# Patient Record
Sex: FEMALE | Race: WHITE | NOT HISPANIC OR LATINO | URBAN - METROPOLITAN AREA
[De-identification: names, ages, dates, MRNs, and addresses within clinical notes are randomized per-mention and may not be internally consistent; named-entity substitution may affect disease eponyms.]

---

## 2023-10-30 ENCOUNTER — TELEPHONE (OUTPATIENT)
Dept: PRIMARY CARE | Facility: CLINIC | Age: 28
End: 2023-10-30

## 2023-10-30 DIAGNOSIS — L21.9 SEBORRHEIC DERMATITIS: Primary | ICD-10-CM

## 2023-10-30 PROBLEM — F41.1 GENERALIZED ANXIETY DISORDER: Status: ACTIVE | Noted: 2021-05-18

## 2023-10-30 RX ORDER — KETOCONAZOLE 20 MG/ML
SHAMPOO, SUSPENSION TOPICAL 2 TIMES WEEKLY
COMMUNITY
End: 2023-10-30 | Stop reason: SDUPTHER

## 2023-10-30 RX ORDER — KETOCONAZOLE 20 MG/ML
SHAMPOO, SUSPENSION TOPICAL 2 TIMES WEEKLY
Qty: 120 ML | Refills: 0 | Status: SHIPPED | OUTPATIENT
Start: 2023-10-30 | End: 2023-12-03

## 2023-12-02 DIAGNOSIS — L21.9 SEBORRHEIC DERMATITIS: ICD-10-CM

## 2023-12-03 RX ORDER — KETOCONAZOLE 20 MG/ML
SHAMPOO, SUSPENSION TOPICAL 2 TIMES WEEKLY
Qty: 120 ML | Refills: 0 | Status: SHIPPED | OUTPATIENT
Start: 2023-12-04

## 2024-09-17 PROBLEM — G43.009 MIGRAINE WITHOUT AURA AND WITHOUT STATUS MIGRAINOSUS, NOT INTRACTABLE: Status: ACTIVE | Noted: 2020-05-08

## 2024-09-19 ENCOUNTER — LAB (OUTPATIENT)
Dept: LAB | Facility: LAB | Age: 29
End: 2024-09-19

## 2024-09-19 ENCOUNTER — OFFICE VISIT (OUTPATIENT)
Dept: PRIMARY CARE | Facility: CLINIC | Age: 29
End: 2024-09-19

## 2024-09-19 VITALS
SYSTOLIC BLOOD PRESSURE: 112 MMHG | HEART RATE: 80 BPM | OXYGEN SATURATION: 98 % | BODY MASS INDEX: 23.24 KG/M2 | TEMPERATURE: 99.3 F | DIASTOLIC BLOOD PRESSURE: 60 MMHG | WEIGHT: 144 LBS

## 2024-09-19 DIAGNOSIS — R53.83 FATIGUE, UNSPECIFIED TYPE: Primary | ICD-10-CM

## 2024-09-19 DIAGNOSIS — N94.6 DYSMENORRHEA: ICD-10-CM

## 2024-09-19 DIAGNOSIS — R53.83 FATIGUE, UNSPECIFIED TYPE: ICD-10-CM

## 2024-09-19 LAB
ALBUMIN SERPL BCP-MCNC: 4.3 G/DL (ref 3.4–5)
ALP SERPL-CCNC: 49 U/L (ref 33–110)
ALT SERPL W P-5'-P-CCNC: 20 U/L (ref 7–45)
ANION GAP SERPL CALCULATED.3IONS-SCNC: 11 MMOL/L (ref 10–20)
AST SERPL W P-5'-P-CCNC: 22 U/L (ref 9–39)
BASOPHILS # BLD AUTO: 0.04 X10*3/UL (ref 0–0.1)
BASOPHILS NFR BLD AUTO: 0.4 %
BILIRUB SERPL-MCNC: 0.6 MG/DL (ref 0–1.2)
BUN SERPL-MCNC: 19 MG/DL (ref 6–23)
CALCIUM SERPL-MCNC: 9.1 MG/DL (ref 8.6–10.3)
CHLORIDE SERPL-SCNC: 105 MMOL/L (ref 98–107)
CO2 SERPL-SCNC: 26 MMOL/L (ref 21–32)
CREAT SERPL-MCNC: 0.75 MG/DL (ref 0.5–1.05)
EGFRCR SERPLBLD CKD-EPI 2021: >90 ML/MIN/1.73M*2
EOSINOPHIL # BLD AUTO: 0.09 X10*3/UL (ref 0–0.7)
EOSINOPHIL NFR BLD AUTO: 0.9 %
ERYTHROCYTE [DISTWIDTH] IN BLOOD BY AUTOMATED COUNT: 12.8 % (ref 11.5–14.5)
GLUCOSE SERPL-MCNC: 80 MG/DL (ref 74–99)
HCT VFR BLD AUTO: 42.7 % (ref 36–46)
HGB BLD-MCNC: 14.2 G/DL (ref 12–16)
IMM GRANULOCYTES # BLD AUTO: 0.03 X10*3/UL (ref 0–0.7)
IMM GRANULOCYTES NFR BLD AUTO: 0.3 % (ref 0–0.9)
LYMPHOCYTES # BLD AUTO: 3.21 X10*3/UL (ref 1.2–4.8)
LYMPHOCYTES NFR BLD AUTO: 32 %
MCH RBC QN AUTO: 28.7 PG (ref 26–34)
MCHC RBC AUTO-ENTMCNC: 33.3 G/DL (ref 32–36)
MCV RBC AUTO: 86 FL (ref 80–100)
MONOCYTES # BLD AUTO: 0.68 X10*3/UL (ref 0.1–1)
MONOCYTES NFR BLD AUTO: 6.8 %
NEUTROPHILS # BLD AUTO: 5.99 X10*3/UL (ref 1.2–7.7)
NEUTROPHILS NFR BLD AUTO: 59.6 %
NRBC BLD-RTO: 0 /100 WBCS (ref 0–0)
PLATELET # BLD AUTO: 208 X10*3/UL (ref 150–450)
POTASSIUM SERPL-SCNC: 4.5 MMOL/L (ref 3.5–5.3)
PROT SERPL-MCNC: 7 G/DL (ref 6.4–8.2)
RBC # BLD AUTO: 4.95 X10*6/UL (ref 4–5.2)
SODIUM SERPL-SCNC: 137 MMOL/L (ref 136–145)
TSH SERPL-ACNC: 0.83 MIU/L (ref 0.44–3.98)
WBC # BLD AUTO: 10 X10*3/UL (ref 4.4–11.3)

## 2024-09-19 PROCEDURE — 99214 OFFICE O/P EST MOD 30 MIN: CPT | Performed by: FAMILY MEDICINE

## 2024-09-19 PROCEDURE — 84443 ASSAY THYROID STIM HORMONE: CPT

## 2024-09-19 PROCEDURE — 85025 COMPLETE CBC W/AUTO DIFF WBC: CPT

## 2024-09-19 PROCEDURE — 80053 COMPREHEN METABOLIC PANEL: CPT

## 2024-09-19 PROCEDURE — 36415 COLL VENOUS BLD VENIPUNCTURE: CPT

## 2024-09-19 ASSESSMENT — ENCOUNTER SYMPTOMS
FATIGUE: 0
MYALGIAS: 0
CHEST TIGHTNESS: 0
ACTIVITY CHANGE: 0
VOMITING: 0
ARTHRALGIAS: 0
HEMATURIA: 0
WEAKNESS: 0
DIZZINESS: 0
SHORTNESS OF BREATH: 0
HEADACHES: 0
PALPITATIONS: 0
NAUSEA: 0
CONSTIPATION: 0
LIGHT-HEADEDNESS: 0
SORE THROAT: 0
DIARRHEA: 0
DIFFICULTY URINATING: 0
FLANK PAIN: 0
CHILLS: 0
FREQUENCY: 0
APPETITE CHANGE: 0
SINUS PAIN: 0
EYE DISCHARGE: 0
WHEEZING: 0
VOICE CHANGE: 1
COUGH: 1
SINUS PRESSURE: 1
FEVER: 0
TROUBLE SWALLOWING: 0
DYSURIA: 1

## 2024-09-19 ASSESSMENT — PAIN SCALES - GENERAL: PAINLEVEL: 0-NO PAIN

## 2024-09-19 NOTE — PROGRESS NOTES
Subjective   Patient ID: Sherry Gamboa is a 29 y.o. female who presents for Follow-up ([facility] urgent care on 9/9, diagnosed with sinusitis. Feeling better, still congested though.).    HPI  Sherry was recently seen for sinusitis at an urgent care  Prescribed doxycycline.   Improvement in symptoms of fever, cough, sinus pressure, aches and congestions.  Congestion and sinus pressure has not fully resolved  Some green mucus coughed up in the morning    Yeast infection shortly after antibiotics, prescribed fluticonazole pills without directions. Took two in 24 hours with slight resolution of symptoms    Some increased bloating 14 days after menstrual period, no change in menstrual timing, bleeding, or pain. Not sexually active.    Would like blood work done for standard annual. Has not yet scheduled her annual.    Review of Systems   Constitutional:  Negative for activity change, appetite change, chills, fatigue and fever.   HENT:  Positive for congestion, ear pain, postnasal drip, sinus pressure and voice change. Negative for hearing loss, sinus pain, sneezing, sore throat and trouble swallowing.    Eyes:  Negative for discharge and visual disturbance.   Respiratory:  Positive for cough (slight, in the morning). Negative for chest tightness, shortness of breath and wheezing.    Cardiovascular:  Negative for chest pain, palpitations and leg swelling.   Gastrointestinal:  Negative for constipation, diarrhea, nausea and vomiting.   Genitourinary:  Positive for dysuria, menstrual problem and vaginal pain (general irritation). Negative for difficulty urinating, enuresis, flank pain, frequency, hematuria, pelvic pain and urgency.   Musculoskeletal:  Negative for arthralgias and myalgias.   Neurological:  Negative for dizziness, syncope, weakness, light-headedness and headaches.   All other systems have been reviewed and are negative except as noted in the HPI.       Objective   Vital Signs:  /60   Pulse 80   Temp  37.4 °C (99.3 °F)   Wt 65.3 kg (144 lb)   SpO2 98%   BMI 23.24 kg/m²     Physical Exam  Vitals and nursing note reviewed.   Constitutional:       Appearance: Normal appearance.   HENT:      Head: Normocephalic.      Right Ear: Tympanic membrane, ear canal and external ear normal.      Left Ear: Tympanic membrane, ear canal and external ear normal.      Nose: Rhinorrhea present. No congestion.      Mouth/Throat:      Mouth: Mucous membranes are moist.      Pharynx: No oropharyngeal exudate or posterior oropharyngeal erythema.   Eyes:      Extraocular Movements: Extraocular movements intact.      Conjunctiva/sclera: Conjunctivae normal.      Pupils: Pupils are equal, round, and reactive to light.   Cardiovascular:      Rate and Rhythm: Normal rate and regular rhythm.      Pulses: Normal pulses.      Heart sounds: Normal heart sounds.   Pulmonary:      Effort: Pulmonary effort is normal.      Breath sounds: Normal breath sounds. No wheezing or rales.   Musculoskeletal:         General: Normal range of motion.      Cervical back: Normal range of motion and neck supple. No tenderness.   Lymphadenopathy:      Cervical: No cervical adenopathy.   Skin:     General: Skin is warm.      Coloration: Skin is not pale.      Findings: No erythema.   Neurological:      General: No focal deficit present.      Mental Status: She is alert.   Psychiatric:         Mood and Affect: Mood normal.         Assessment & Plan  Fatigue, unspecified type  Still recovering from recent illness, antibiotics might have cleared source of sinusitis, but it will still take some time for rest of symptoms to resolve completely.    Return to office with signs or worsening symptoms of fever, night sweats, cough, shortness of breath, or without feeling better after 7-10 days.  Orders:    CBC and Auto Differential; Future    Comprehensive metabolic panel; Future    TSH with reflex to Free T4 if abnormal; Future    Dysmenorrhea  Bloating could be due to  changes in progesterone during ovulation, recommended seeing gynecologist to have general evaluation and yearly check up.    Orders:    CBC and Auto Differential; Future    Comprehensive metabolic panel; Future    TSH with reflex to Free T4 if abnormal; Future    Return for a complete physical and annual wellness exam to go over future blood work and health history.    Follow up  for a scheduled annual physical exam , sooner with any problems or concerns.    Partha Rosario, MS3    I was present with the medical student who participated in the documentation of this note. I have personally seen and examined the patient and performed the medical decision-making components. I have reviewed the medical student documentation and verified the findings in the note as written with additions or exceptions as stated in the body of the note.  Paige Lawrence MD

## 2024-09-26 ENCOUNTER — TELEPHONE (OUTPATIENT)
Dept: PRIMARY CARE | Facility: CLINIC | Age: 29
End: 2024-09-26

## 2024-09-30 ENCOUNTER — TELEPHONE (OUTPATIENT)
Dept: PRIMARY CARE | Facility: CLINIC | Age: 29
End: 2024-09-30

## 2024-09-30 NOTE — TELEPHONE ENCOUNTER
Called and spoke with mother, she is aware. She wants a TRINA to be done. Advised I would ask [facility].

## 2024-09-30 NOTE — TELEPHONE ENCOUNTER
Called patient back, I informed her of [facility] message she is aware. She is transferred to phone room to schedule CPE

## 2024-09-30 NOTE — TELEPHONE ENCOUNTER
Pts mom called and was informed of [facility] message ,  she stated pt is still having issues with her lips. She wants the TRINA. Please advise

## 2025-04-23 ENCOUNTER — OFFICE VISIT (OUTPATIENT)
Dept: PRIMARY CARE | Facility: CLINIC | Age: 30
End: 2025-04-23

## 2025-04-23 VITALS
WEIGHT: 138 LBS | SYSTOLIC BLOOD PRESSURE: 124 MMHG | OXYGEN SATURATION: 98 % | BODY MASS INDEX: 22.27 KG/M2 | HEART RATE: 85 BPM | TEMPERATURE: 98.4 F | DIASTOLIC BLOOD PRESSURE: 74 MMHG

## 2025-04-23 DIAGNOSIS — R30.0 DYSURIA: ICD-10-CM

## 2025-04-23 DIAGNOSIS — N89.8 VAGINAL ODOR: ICD-10-CM

## 2025-04-23 DIAGNOSIS — N89.8 VAGINAL ITCHING: Primary | ICD-10-CM

## 2025-04-23 LAB
POC APPEARANCE, URINE: CLEAR
POC BILIRUBIN, URINE: NEGATIVE
POC BLOOD, URINE: NEGATIVE
POC COLOR, URINE: YELLOW
POC GLUCOSE, URINE: NEGATIVE MG/DL
POC KETONES, URINE: NEGATIVE MG/DL
POC LEUKOCYTES, URINE: NEGATIVE
POC NITRITE,URINE: NEGATIVE
POC PH, URINE: 6.5 PH
POC PROTEIN, URINE: NEGATIVE MG/DL
POC SPECIFIC GRAVITY, URINE: 1.01
POC UROBILINOGEN, URINE: 0.2 EU/DL

## 2025-04-23 PROCEDURE — 1036F TOBACCO NON-USER: CPT

## 2025-04-23 PROCEDURE — 81003 URINALYSIS AUTO W/O SCOPE: CPT

## 2025-04-23 PROCEDURE — 99213 OFFICE O/P EST LOW 20 MIN: CPT

## 2025-04-23 RX ORDER — METRONIDAZOLE 500 MG/1
1 TABLET ORAL EVERY 12 HOURS
COMMUNITY
Start: 2025-04-17 | End: 2025-04-25 | Stop reason: ALTCHOICE

## 2025-04-23 RX ORDER — FLUCONAZOLE 150 MG/1
TABLET ORAL
COMMUNITY
Start: 2025-04-17

## 2025-04-23 ASSESSMENT — PAIN SCALES - GENERAL: PAINLEVEL_OUTOF10: 0-NO PAIN

## 2025-04-23 ASSESSMENT — ENCOUNTER SYMPTOMS
DIARRHEA: 0
FREQUENCY: 0
FLANK PAIN: 0
CHILLS: 0
DYSURIA: 1
NAUSEA: 1
VOMITING: 0
FEVER: 0
HEMATURIA: 0
CONSTIPATION: 0

## 2025-04-23 NOTE — PROGRESS NOTES
Subjective   Patient ID: Sherry Gamboa is a 30 y.o. female who presents for Vaginitis/Bacterial Vaginosis (C/o itching and burning, lower abdominal pressure x 1 month/Pt was on abx and sx have gotten worse, she did not take them last night due to abx causing nausea).    Sherry is a 29 yo female presenting for 1 month of vaginal symptoms.     Has had BV in the past. Symptoms started 1 month ago -- bad odor and increased discharge with irritation. Saw a televisit provider who went in flagyl and diflucan - both of which did not help. She states that some of the discharge went away and now she is having burning with urination and itchiness. Since taking antibiotics has had some nausea -- she discontinued the flagyl yesterday.   Went to  yesterday who got a UA and sent for culture but did not do a pelvic exam at her request.     Never had sex before. States she did have a partner 2 months ago and there was some genital touching/rubbing with no penetration. She does not know if he had any STIs.   Uses [employer] unscented - only cleans outside   Shaves somewhat but not completely -- no problems with this, no razor burn or ingrown hairs     Had LMP 2 weeks ago - she is extremely regular. Occasionally has some brown colored discharge at ovulation - this is very normal for her.       Patient Care Team:  Paige Lawrence MD as PCP - General (Family Medicine)    PMH, PSH, family history and social history were reviewed and updated.    Review of Systems   Constitutional:  Negative for chills and fever.   HENT: Negative.     Gastrointestinal:  Positive for nausea. Negative for constipation, diarrhea and vomiting.   Genitourinary:  Positive for dysuria, pelvic pain, vaginal discharge and vaginal pain. Negative for flank pain, frequency, hematuria, menstrual problem, urgency and vaginal bleeding.       Objective   /74   Pulse 85   Temp 36.9 °C (98.4 °F)   Wt 62.6 kg (138 lb)   SpO2 98%   BMI 22.27 kg/m²     Physical  Exam  Constitutional:       General: She is not in acute distress.     Appearance: Normal appearance. She is not ill-appearing.   Abdominal:      General: Abdomen is flat. There is no distension.      Palpations: Abdomen is soft. There is no mass.      Tenderness: There is no abdominal tenderness. There is no right CVA tenderness, left CVA tenderness, guarding or rebound.      Hernia: No hernia is present.   Genitourinary:     General: Normal vulva.      Exam position: Lithotomy position.      Pubic Area: No rash.       Labia:         Right: No rash, tenderness or lesion.         Left: No rash, tenderness or lesion.       Vagina: No vaginal discharge.      Comments: Patient has labia minora that is external of labia majora   Skin:     General: Skin is warm and dry.   Neurological:      Mental Status: She is alert and oriented to person, place, and time.   Psychiatric:         Mood and Affect: Mood normal.         Behavior: Behavior normal.         Assessment/Plan   Assessment & Plan  Vaginal itching    Orders:    Vaginitis Gram Stain For Bacterial Vaginosis + Yeast    C. trachomatis / N. gonorrhoeae, Amplified, Urogenital    Will swab for yeast and BV though low suspicion for this as diflucan and flagyl did not help much.   Patient concerned for possible PID -- told her more likely with STIs -- she would like to be tested just to be safe due to sexual activity 2 months ago -- again low suspicion for this being the source    Advised PAP and patient was very against this -- states she previously had a bad experience with this attempting to be done and it was very painful. Patient wants to wait for now -- will consider referral to gynecology if all results neg    Due to patient's anatomy, advised that this could possibly be irritation of labia minora. Advised using barrier cream for the next couple of days to lessen friction and report back.    Patient will await results and try barrier cream -- she will follow up if  not better in the next week   Dysuria    Orders:    POCT UA Automated manually resulted    Vaginitis Gram Stain For Bacterial Vaginosis + Yeast    C. trachomatis / N. gonorrhoeae, Amplified, Urogenital    Vaginal odor    Orders:    Vaginitis Gram Stain For Bacterial Vaginosis + Yeast    C. trachomatis / N. gonorrhoeae, Amplified, Urogenital      Follow up 1 Week if worsening/no improvement, sooner with any problems or concerns.

## 2025-04-24 ENCOUNTER — TELEPHONE (OUTPATIENT)
Dept: PRIMARY CARE | Facility: CLINIC | Age: 30
End: 2025-04-24

## 2025-04-24 NOTE — TELEPHONE ENCOUNTER
Patient would like a topical steroid cream for perioral dermatitis, she stated [facility] prescribed it before. Patient doesn't remember the name. She stated she forgot to mention it when she met with [facility] on 04-23-25.    Patient would also like the results from labs that [facility] ordered on 04-23-25    Patient can be reached at [phone]

## 2025-04-25 ENCOUNTER — TELEPHONE (OUTPATIENT)
Dept: PRIMARY CARE | Facility: CLINIC | Age: 30
End: 2025-04-25

## 2025-04-25 DIAGNOSIS — N76.0 ACUTE VAGINITIS: Primary | ICD-10-CM

## 2025-04-25 LAB
BV SCORE VAG QL: NORMAL
C TRACH RRNA SPEC QL NAA+PROBE: NOT DETECTED
N GONORRHOEA RRNA SPEC QL NAA+PROBE: NOT DETECTED
QUEST GC CT AMPLIFIED (ALWAYS MESSAGE): NORMAL

## 2025-04-25 RX ORDER — SULFAMETHOXAZOLE AND TRIMETHOPRIM 800; 160 MG/1; MG/1
1 TABLET ORAL 2 TIMES DAILY
Qty: 14 TABLET | Refills: 0 | Status: SHIPPED | OUTPATIENT
Start: 2025-04-25 | End: 2025-05-02

## 2025-06-03 ENCOUNTER — APPOINTMENT (OUTPATIENT)
Dept: URGENT CARE | Age: 30
End: 2025-06-03

## 2025-06-04 ENCOUNTER — TELEPHONE (OUTPATIENT)
Dept: PRIMARY CARE | Facility: CLINIC | Age: 30
End: 2025-06-04

## 2025-06-04 NOTE — TELEPHONE ENCOUNTER
Patient went to urgent care yesterday 06/03. She has a bacterial infection in her lymph node by her ear. She was put on some medications. She says every few weeks she is getting sick with something. She didn't know if there was something she was missing. Can she get her blood tested to see if anything is wrong? She says it is not like her to just keep getting sick.

## 2025-06-10 ENCOUNTER — APPOINTMENT (OUTPATIENT)
Dept: PRIMARY CARE | Facility: CLINIC | Age: 30
End: 2025-06-10

## 2025-06-12 ENCOUNTER — TELEPHONE (OUTPATIENT)
Dept: PRIMARY CARE | Facility: CLINIC | Age: 30
End: 2025-06-12

## 2025-06-12 NOTE — TELEPHONE ENCOUNTER
Patients mother Ave calling [phone]. She was trying to get daughter to be seen sooner. Although patients appointment with C is on 07/21/25. Patient scheduled that earlier today 06/12 when she called in. Mom was concerned of her waiting that long for an appointment and also was requesting an TRINA be drawn? Mom wanted me to say that the daughter was requesting this as well because they have talked together however, I did not speak with patient so I told I can only send a message with whom I have talked to. Patient is on a wait list if anything comes up sooner to discuss her situation. Please advise.

## 2025-06-12 NOTE — TELEPHONE ENCOUNTER
Called patient, left VM explaining that she can see another provider since [facility] has no opening until her 07/21 appointment.

## 2025-07-21 ENCOUNTER — APPOINTMENT (OUTPATIENT)
Dept: PRIMARY CARE | Facility: CLINIC | Age: 30
End: 2025-07-21

## 2025-07-24 ENCOUNTER — APPOINTMENT (OUTPATIENT)
Dept: PRIMARY CARE | Facility: CLINIC | Age: 30
End: 2025-07-24

## 2025-07-24 VITALS
SYSTOLIC BLOOD PRESSURE: 108 MMHG | OXYGEN SATURATION: 99 % | HEART RATE: 66 BPM | BODY MASS INDEX: 21.79 KG/M2 | WEIGHT: 135 LBS | DIASTOLIC BLOOD PRESSURE: 76 MMHG

## 2025-07-24 DIAGNOSIS — B99.9 RECURRENT INFECTIONS: Primary | ICD-10-CM

## 2025-07-24 DIAGNOSIS — Z83.2 FAMILY HISTORY OF AUTOIMMUNE DISORDER: ICD-10-CM

## 2025-07-24 DIAGNOSIS — R68.83 CHILLS: ICD-10-CM

## 2025-07-24 PROCEDURE — 99213 OFFICE O/P EST LOW 20 MIN: CPT | Performed by: FAMILY MEDICINE

## 2025-07-24 ASSESSMENT — ANXIETY QUESTIONNAIRES
4. TROUBLE RELAXING: NOT AT ALL
7. FEELING AFRAID AS IF SOMETHING AWFUL MIGHT HAPPEN: NOT AT ALL
1. FEELING NERVOUS, ANXIOUS, OR ON EDGE: SEVERAL DAYS
GAD7 TOTAL SCORE: 3
6. BECOMING EASILY ANNOYED OR IRRITABLE: NOT AT ALL
IF YOU CHECKED OFF ANY PROBLEMS ON THIS QUESTIONNAIRE, HOW DIFFICULT HAVE THESE PROBLEMS MADE IT FOR YOU TO DO YOUR WORK, TAKE CARE OF THINGS AT HOME, OR GET ALONG WITH OTHER PEOPLE: SOMEWHAT DIFFICULT
3. WORRYING TOO MUCH ABOUT DIFFERENT THINGS: SEVERAL DAYS
5. BEING SO RESTLESS THAT IT IS HARD TO SIT STILL: NOT AT ALL
2. NOT BEING ABLE TO STOP OR CONTROL WORRYING: SEVERAL DAYS

## 2025-07-24 ASSESSMENT — PAIN SCALES - GENERAL: PAINLEVEL_OUTOF10: 0-NO PAIN

## 2025-07-24 ASSESSMENT — PATIENT HEALTH QUESTIONNAIRE - PHQ9
SUM OF ALL RESPONSES TO PHQ9 QUESTIONS 1 AND 2: 0
1. LITTLE INTEREST OR PLEASURE IN DOING THINGS: NOT AT ALL
2. FEELING DOWN, DEPRESSED OR HOPELESS: NOT AT ALL

## 2025-07-24 ASSESSMENT — COLUMBIA-SUICIDE SEVERITY RATING SCALE - C-SSRS: 1. IN THE PAST MONTH, HAVE YOU WISHED YOU WERE DEAD OR WISHED YOU COULD GO TO SLEEP AND NOT WAKE UP?: NO

## 2025-07-24 NOTE — PROGRESS NOTES
Subjective   Patient ID: Sherry Gamboa is a 30 y.o. female who presents for Follow-up (Pt is in for lab review).    PAULA Powell presents complaining of frequent illnesses.  Getting URIs, stomach infections, yeast infections, every 2 weeks or so.  Eats health.  Working out 3-4 times per week. Taking probiotics daily for the past month along with a mutli-vitamin.     Review of Systems    Objective   Vital Signs:  /76 (BP Location: Left arm, Patient Position: Sitting)   Pulse 66   Wt 61.2 kg (135 lb)   LMP 07/01/2025 (Approximate)   SpO2 99%   BMI 21.79 kg/m²   Wt Readings from Last 3 Encounters:   07/24/25 61.2 kg (135 lb)   04/23/25 62.6 kg (138 lb)   09/19/24 65.3 kg (144 lb)       Physical Exam    Assessment & Plan      Follow up {Follow Up:10363}, sooner with any problems or concerns.   Acid; Future    Immunoglobulins, IgG, IgA, IgM; Future    Family history of autoimmune disorder    Orders:    CBC and Auto Differential; Future    Comprehensive Metabolic Panel; Future    TRINA with Reflex to GINGER; Future    Antistreptolysin O Titer; Future    Citrulline Antibody, IgG; Future    C-Reactive Protein; Future    Rheumatoid Factor; Future    Sedimentation Rate; Future    Uric Acid; Future    Immunoglobulins, IgG, IgA, IgM; Future      Follow up PRN, sooner with any problems or concerns.

## 2025-07-25 LAB
ALBUMIN SERPL-MCNC: 4.3 G/DL (ref 3.6–5.1)
ALP SERPL-CCNC: 44 U/L (ref 31–125)
ALT SERPL-CCNC: 17 U/L (ref 6–29)
ANA SER QL IF: NEGATIVE
ANION GAP SERPL CALCULATED.4IONS-SCNC: 7 MMOL/L (CALC) (ref 7–17)
ASO AB SERPL-ACNC: <20 IU/ML
AST SERPL-CCNC: 18 U/L (ref 10–30)
BASOPHILS # BLD AUTO: 33 CELLS/UL (ref 0–200)
BASOPHILS NFR BLD AUTO: 0.5 %
BILIRUB SERPL-MCNC: 0.6 MG/DL (ref 0.2–1.2)
BUN SERPL-MCNC: 22 MG/DL (ref 7–25)
CALCIUM SERPL-MCNC: 9.3 MG/DL (ref 8.6–10.2)
CCP IGG SERPL-ACNC: <16 UNITS
CHLORIDE SERPL-SCNC: 105 MMOL/L (ref 98–110)
CO2 SERPL-SCNC: 26 MMOL/L (ref 20–32)
CREAT SERPL-MCNC: 0.69 MG/DL (ref 0.5–0.96)
CRP SERPL-MCNC: <3 MG/L
EGFRCR SERPLBLD CKD-EPI 2021: 120 ML/MIN/1.73M2
EOSINOPHIL # BLD AUTO: 52 CELLS/UL (ref 15–500)
EOSINOPHIL NFR BLD AUTO: 0.8 %
ERYTHROCYTE [DISTWIDTH] IN BLOOD BY AUTOMATED COUNT: 12.4 % (ref 11–15)
ERYTHROCYTE [SEDIMENTATION RATE] IN BLOOD BY WESTERGREN METHOD: 6 MM/H
GLUCOSE SERPL-MCNC: 75 MG/DL (ref 65–139)
HCT VFR BLD AUTO: 41 % (ref 35–45)
HGB BLD-MCNC: 13.2 G/DL (ref 11.7–15.5)
IGA SERPL-MCNC: 197 MG/DL (ref 47–310)
IGG SERPL-MCNC: 1193 MG/DL (ref 600–1640)
IGM SERPL-MCNC: 372 MG/DL (ref 50–300)
LYMPHOCYTES # BLD AUTO: 2080 CELLS/UL (ref 850–3900)
LYMPHOCYTES NFR BLD AUTO: 32 %
MCH RBC QN AUTO: 29.3 PG (ref 27–33)
MCHC RBC AUTO-ENTMCNC: 32.2 G/DL (ref 32–36)
MCV RBC AUTO: 90.9 FL (ref 80–100)
MONOCYTES # BLD AUTO: 403 CELLS/UL (ref 200–950)
MONOCYTES NFR BLD AUTO: 6.2 %
NEUTROPHILS # BLD AUTO: 3933 CELLS/UL (ref 1500–7800)
NEUTROPHILS NFR BLD AUTO: 60.5 %
PLATELET # BLD AUTO: 188 THOUSAND/UL (ref 140–400)
PMV BLD REES-ECKER: 11.8 FL (ref 7.5–12.5)
POTASSIUM SERPL-SCNC: 4 MMOL/L (ref 3.5–5.3)
PROT SERPL-MCNC: 6.9 G/DL (ref 6.1–8.1)
RBC # BLD AUTO: 4.51 MILLION/UL (ref 3.8–5.1)
RHEUMATOID FACT SERPL-ACNC: <10 IU/ML
SODIUM SERPL-SCNC: 138 MMOL/L (ref 135–146)
URATE SERPL-MCNC: 4.3 MG/DL (ref 2.5–7)
WBC # BLD AUTO: 6.5 THOUSAND/UL (ref 3.8–10.8)

## 2025-07-28 ENCOUNTER — TELEPHONE (OUTPATIENT)
Dept: PRIMARY CARE | Facility: CLINIC | Age: 30
End: 2025-07-28

## 2025-07-28 NOTE — TELEPHONE ENCOUNTER
Patient wants to know what to do next since all her lab work came back good and she is still sick.